# Patient Record
Sex: FEMALE | Race: OTHER | NOT HISPANIC OR LATINO
[De-identification: names, ages, dates, MRNs, and addresses within clinical notes are randomized per-mention and may not be internally consistent; named-entity substitution may affect disease eponyms.]

---

## 2022-03-29 PROBLEM — Z00.00 ENCOUNTER FOR PREVENTIVE HEALTH EXAMINATION: Status: ACTIVE | Noted: 2022-03-29

## 2022-03-31 ENCOUNTER — NON-APPOINTMENT (OUTPATIENT)
Age: 67
End: 2022-03-31

## 2022-03-31 ENCOUNTER — RESULT REVIEW (OUTPATIENT)
Age: 67
End: 2022-03-31

## 2022-03-31 ENCOUNTER — APPOINTMENT (OUTPATIENT)
Dept: NEPHROLOGY | Facility: CLINIC | Age: 67
End: 2022-03-31
Payer: MEDICARE

## 2022-03-31 VITALS
DIASTOLIC BLOOD PRESSURE: 73 MMHG | HEIGHT: 64 IN | SYSTOLIC BLOOD PRESSURE: 162 MMHG | HEART RATE: 84 BPM | WEIGHT: 150 LBS | BODY MASS INDEX: 25.61 KG/M2

## 2022-03-31 DIAGNOSIS — R35.0 FREQUENCY OF MICTURITION: ICD-10-CM

## 2022-03-31 DIAGNOSIS — R30.0 DYSURIA: ICD-10-CM

## 2022-03-31 PROCEDURE — 99204 OFFICE O/P NEW MOD 45 MIN: CPT

## 2022-03-31 RX ORDER — LISINOPRIL 40 MG/1
40 TABLET ORAL
Refills: 0 | Status: ACTIVE | COMMUNITY

## 2022-03-31 NOTE — CONSULT LETTER
[Dear  ___] : Dear  [unfilled], [Consult Letter:] : I had the pleasure of evaluating your patient, [unfilled]. [Please see my note below.] : Please see my note below. [Consult Closing:] : Thank you very much for allowing me to participate in the care of this patient.  If you have any questions, please do not hesitate to contact me. [Sincerely,] : Sincerely, [FreeTextEntry2] : Samantha Amin - WILL, [place] [facility] [FreeTextEntry3] : Sincerely, \par \par Humberto Gan MD, FACP\par

## 2022-03-31 NOTE — ASSESSMENT
[FreeTextEntry1] : 66-year-old woman with longstanding hypertension and type 2 diabetes, with perhaps slightly suboptimal diabetic control and similarly with blood pressure somewhat borderline.  Her home readings are considerably better than her office, consistent with a degree of whitecoat effect.  I have ordered an ABPM to clarify that issue.  I have also ordered a renal ultrasound to assess kidney size, echogenicity, and rule out obstructive uropathy.  Labs have been ordered to include BMP, Cystatin C, urine culture and sensitivity, urinalysis.  I suspect that her frequency and dysuria are related to lower tract issues and she may need a urology consult later.

## 2022-03-31 NOTE — HISTORY OF PRESENT ILLNESS
[FreeTextEntry1] : 66-year-old woman referred by Samantha Amin at [facility] in [place], because of an elevated creatinine.  It was 1.33-1.36 much of the last year, and migdalia to 1.446 weeks ago, with a GFR of 37, K of 3.8, CO2 28.  She has had type 2 diabetes for at least 10 years, and her A1c typically runs 6.9-7.4 recently.  She has been hypertensive for at least 10 years and is on amlodipine 10 mg daily and lisinopril 40 mg daily.  She has not exhibited microalbuminuria.  However she does have diabetic neuropathy affecting her feet.  There is no known retinopathy but she does have cataracts.  Her home BP is typically 135 systolic and she gets nervous when in doctors offices.  This visit was conducted with the help of [employer], #[id number] .  She mentioned that she has chronic frequency and dysuria, but no definite history of UTIs.  She has not seen a urologist as of yet.

## 2022-04-04 ENCOUNTER — OUTPATIENT (OUTPATIENT)
Dept: OUTPATIENT SERVICES | Facility: HOSPITAL | Age: 67
LOS: 1 days | End: 2022-04-04
Payer: MEDICARE

## 2022-04-04 ENCOUNTER — APPOINTMENT (OUTPATIENT)
Dept: ULTRASOUND IMAGING | Facility: HOSPITAL | Age: 67
End: 2022-04-04
Payer: MEDICARE

## 2022-04-04 LAB
ANION GAP SERPL CALC-SCNC: 12 MMOL/L
APPEARANCE: CLEAR
BACTERIA UR CULT: NORMAL
BACTERIA: ABNORMAL
BILIRUBIN URINE: NEGATIVE
BLOOD URINE: NEGATIVE
BUN SERPL-MCNC: 16 MG/DL
CALCIUM SERPL-MCNC: 9.6 MG/DL
CALCIUM SERPL-MCNC: 9.6 MG/DL
CHLORIDE SERPL-SCNC: 99 MMOL/L
CO2 SERPL-SCNC: 26 MMOL/L
COLOR: COLORLESS
CREAT SERPL-MCNC: 1.36 MG/DL
CYSTATIN C SERPL-MCNC: 1.23 MG/L
EGFR: 43 ML/MIN/1.73M2
GFR/BSA.PRED SERPLBLD CYS-BASED-ARV: 54 ML/MIN/1.73M2
GLUCOSE QUALITATIVE U: NEGATIVE
GLUCOSE SERPL-MCNC: 137 MG/DL
HYALINE CASTS: 0 /LPF
KETONES URINE: NEGATIVE
LEUKOCYTE ESTERASE URINE: NEGATIVE
MICROSCOPIC-UA: NORMAL
NITRITE URINE: NEGATIVE
PARATHYROID HORMONE INTACT: 59 PG/ML
PH URINE: 7
POTASSIUM SERPL-SCNC: 4.4 MMOL/L
PROTEIN URINE: NEGATIVE
RED BLOOD CELLS URINE: 0 /HPF
SODIUM SERPL-SCNC: 137 MMOL/L
SPECIFIC GRAVITY URINE: 1.01
SQUAMOUS EPITHELIAL CELLS: 1 /HPF
UROBILINOGEN URINE: NORMAL
WHITE BLOOD CELLS URINE: 0 /HPF

## 2022-04-04 PROCEDURE — 76770 US EXAM ABDO BACK WALL COMP: CPT | Mod: 26

## 2022-04-04 PROCEDURE — 76770 US EXAM ABDO BACK WALL COMP: CPT

## 2022-05-19 ENCOUNTER — APPOINTMENT (OUTPATIENT)
Dept: NEPHROLOGY | Facility: CLINIC | Age: 67
End: 2022-05-19
Payer: MEDICARE

## 2022-05-19 VITALS
WEIGHT: 150 LBS | HEART RATE: 72 BPM | DIASTOLIC BLOOD PRESSURE: 68 MMHG | BODY MASS INDEX: 25.61 KG/M2 | HEIGHT: 64 IN | SYSTOLIC BLOOD PRESSURE: 116 MMHG

## 2022-05-19 DIAGNOSIS — E11.9 TYPE 2 DIABETES MELLITUS W/OUT COMPLICATIONS: ICD-10-CM

## 2022-05-19 DIAGNOSIS — N18.32 CHRONIC KIDNEY DISEASE, STAGE 3B: ICD-10-CM

## 2022-05-19 DIAGNOSIS — I10 ESSENTIAL (PRIMARY) HYPERTENSION: ICD-10-CM

## 2022-05-19 DIAGNOSIS — Z86.39 PERSONAL HISTORY OF OTHER ENDOCRINE, NUTRITIONAL AND METABOLIC DISEASE: ICD-10-CM

## 2022-05-19 PROCEDURE — 99214 OFFICE O/P EST MOD 30 MIN: CPT

## 2022-05-19 RX ORDER — AMLODIPINE BESYLATE 10 MG/1
10 TABLET ORAL DAILY
Qty: 30 | Refills: 1 | Status: ACTIVE | COMMUNITY

## 2022-05-19 NOTE — CONSULT LETTER
[Dear  ___] : Dear  [unfilled], [Consult Letter:] : I had the pleasure of evaluating your patient, [unfilled]. [Please see my note below.] : Please see my note below. [Consult Closing:] : Thank you very much for allowing me to participate in the care of this patient.  If you have any questions, please do not hesitate to contact me. [Sincerely,] : Sincerely, [FreeTextEntry2] : Samantha Amin, NP - ACP/[employer] [FreeTextEntry3] : Sincerely, \par \par Humberto Gan MD, FACP\par

## 2022-05-19 NOTE — ASSESSMENT
[FreeTextEntry1] : 66-year-old woman with well-controlled hypertension, stable CKD 3 A -I am renewing her amlodipine, and ordering labs to be done in 6 months to include CBC, BMP, PTH, urine microalbumin, A1c.  She will follow up regularly with Samantha Amin NP

## 2022-05-19 NOTE — HISTORY OF PRESENT ILLNESS
[FreeTextEntry1] : 66-year-old hypertensive diabetic referred by Samantha Amin at [facility] in [place], because of an elevated creatinine.  It was in the range of 1.3-1.45.  In late March, her creatinine was 1.36 with a GFR of 43, and her Cystatin C was 1.23 with a GFR from that of 54.  K was 4.4, CO2 26, calcium 9.6, PTH 59, no proteinuria on urinalysis.  She has exhibited whitecoat effect at times but her BP is generally good.  She came with her sister who acted as .